# Patient Record
Sex: MALE | Race: BLACK OR AFRICAN AMERICAN | Employment: UNEMPLOYED | URBAN - METROPOLITAN AREA
[De-identification: names, ages, dates, MRNs, and addresses within clinical notes are randomized per-mention and may not be internally consistent; named-entity substitution may affect disease eponyms.]

---

## 2020-01-31 ENCOUNTER — TRANSFERRED RECORDS (OUTPATIENT)
Dept: HEALTH INFORMATION MANAGEMENT | Facility: CLINIC | Age: 50
End: 2020-01-31

## 2020-02-03 ENCOUNTER — MEDICAL CORRESPONDENCE (OUTPATIENT)
Dept: HEALTH INFORMATION MANAGEMENT | Facility: CLINIC | Age: 50
End: 2020-02-03

## 2020-02-14 DIAGNOSIS — H35.52 RP (RETINITIS PIGMENTOSA): Primary | ICD-10-CM

## 2020-03-21 ENCOUNTER — HOSPITAL (OUTPATIENT)
Dept: OTHER | Age: 50
End: 2020-03-21

## 2020-03-21 PROCEDURE — 99283 EMERGENCY DEPT VISIT LOW MDM: CPT | Performed by: EMERGENCY MEDICINE

## 2020-03-26 ENCOUNTER — TELEPHONE (OUTPATIENT)
Dept: OPHTHALMOLOGY | Facility: CLINIC | Age: 50
End: 2020-03-26

## 2020-03-26 NOTE — TELEPHONE ENCOUNTER
03-26:  03-26:  Phoned [facility] and spoke w/Walter ([phone]) to inform Dr. Nakul Sow that the [facility] has made the decision to reschedule the GVF+ffERG appt (prev sched for 04-20) at this time in attempts to control COVID-19.  We will be contacting patients to reschedule.     Phoned and spoke w/pt and informed of the above.  Understands to contact his eye care provider if any vision concerns in the meantime.     Sent IB to  to cancel 2 appts for 04-20.    03-27:  To schedule June-July as COVID-19 restrictions allow.

## 2020-08-04 ENCOUNTER — APPOINTMENT (OUTPATIENT)
Dept: OPHTHALMOLOGY | Facility: CLINIC | Age: 50
End: 2020-08-04
Attending: OPHTHALMOLOGY
Payer: COMMERCIAL

## 2020-08-04 DIAGNOSIS — H35.52 RP (RETINITIS PIGMENTOSA): ICD-10-CM

## 2020-08-04 PROCEDURE — 92273 FULL FIELD ERG W/I&R: CPT | Mod: ZF

## 2020-08-04 PROCEDURE — 40000269 ZZH STATISTIC NO CHARGE FACILITY FEE: Mod: ZF

## 2020-08-04 PROCEDURE — 92083 EXTENDED VISUAL FIELD XM: CPT | Mod: ZF

## 2020-08-04 ASSESSMENT — VISUAL ACUITY
OD_CC: 20/150
CORRECTION_TYPE: GLASSES
OS_CC: 20/400
METHOD: SNELLEN - LINEAR

## 2020-08-04 ASSESSMENT — REFRACTION_WEARINGRX
OD_SPHERE: +3.00
OD_ADD: +3.00
SPECS_TYPE: BIFOCAL
OD_AXIS: 160
OD_CYLINDER: +1.00
OS_ADD: +3.00
OS_AXIS: 104
OS_SPHERE: +2.00
OS_CYLINDER: +0.75

## 2020-08-04 NOTE — PROGRESS NOTES
"2020    STANDARD ERG REPORT    Referring: Dr. Nakul Sow/[facility]    RE:  Fab Boyd  MRN:  [id number]  :  1970    ERG Date:  2020    CLINICAL HISTORY: patient noted tunnel vision and foggy vision associated with floaters and flashes.  Flashes are constant with eyes open or shut-- white flashes going in circles at times  and brighter flashes in periphery.  Believes vision worse at night/dim light and noted for a while (~10 yrs) but worsening for the past 1-2 yrs.  Takes a long time for eyes to adjust from bright to dark and dark to light.  Increased light sensitivity where he needs a visor/cap and sungls if in very bright light situations.  Moved to [place] in  from [place] and saw eye doctors in [place] with a lot of testing.  From at least ~3001-9141, he still had good enough vision and depth perception and coordination to read, ride motorcycle, play sports, do target shooting. He always had difficulty with dark blue vs black and felt he was clumsy (tripping over things) but did not feel vision any different than others around him.  H/O older sister with vision loss but reason undetermined.  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    Dr. Sow advised Areds supplements twice daily (pt using once daily).  Under care of Dr. Abundio Kyle for glaucoma ~6yrs, using Latanoprost both eyes at bedtime (last dose 2 days ago).  S/P peripheral iridotomy with Dr. Kyle 2016 both eyes and again 2018 left eye (per Chart Review).     IMPRESSION: 1. Advanced diana-cone dystrophy                  Visual Acuity Right Eye : 20/150, PHNI      w/gls, +3.00 + 1.00x160    Visual Acuity Left Eye : 20/400, PHNI      w/gls, +2.50 + 0.75x104                          ALL AVERAGED  Data for Full-Field ERG Right Eye   Left Eye    Dark-Adapted Patient Normal Patient   0.01 ERG (diana) amplitude( v) ---- 61.3-334.7 ----   0.01 ERG (diana) implicit time(ms) ---- " 75.1-108.0 ----   MMMMM      3.0 ERG (combined) a-wave amplitude( v) ---- -195.0 to -96.5 ----   3.0 ERG (combined) a-wave implicit time(ms) ---- 15.2-18.2 ----   3.0 ERG (combined) b-wave amplitude( v) ---- 115.0-446.3 ----   3.0 ERG (combined) b-wave implicit time(ms) ---- 30.0-50.3 ----   MMMMM      3.0 Oscillatory Potentials   Present     Light-Adapted      3.0 Flicker (30-Hz) amplitude( v) ---- 68.5-147.5 ----   3.0 Flicker (30-Hz) implicit time(ms) ---- 21.5-26.8 ----         3.0 ERG (cone) a-wave amplitude( v) ---- -59.8 to -24.4 ----   3.0 ERG (cone) a-wave implicit time(ms) ---- 14.9-18.0 ----   3.0 ERG (cone) b-wave amplitude( v) ---- 86.1-205.2 ----   3.0 ERG (cone) b-wave implicit time(ms) ---- 25.8-29.6 ----         * = manipulated cursors        parentheses = cursors at selected peaks        ---- = residual to non-measurable        xxxx = not tested      INTERPRETATION:        This full field electroretinogram was performed according to ISCEV standards using [employer] E3 system and DTL fiber recording electrodes. The patient tolerated the testing well.  The waveforms were residual to non-measurable.  The normative values provided above represent the 95% confidence limits for a normal individual the age of the patient.   In dark adapted conditions, the diana-specific responses and combined diana-done responses were residual to non-measurable  The oscillatory potentials were were residual to non-measurable.  In light adapted conditions, the 30-Hz flicker responses and single photopic responses were residual to non-measurable.    Conclusion: This represents an abnormal electroretinogram consistent with the diagnosis of advanced diana-cone dystrophy.  The scotopic and photopic responses were residual to non-measurable. The differential diagnosis includes: post-inflammatory retinopathy, toxic retinopathy and Autoimmune Retinopathy    Clinical correlation is recommended.     Dear Eber, thank you for the opportunity  to provide electrophysiologic services for this patient.  Please do not hesitate to call if there should be any questions regarding these results.    Muriel Zapata MD     [facility]   Department of Ophthalmology & Visual Neurosciences   [facility]  Phone: [phone]   Fax: [phone]

## 2020-08-04 NOTE — NURSING NOTE
"Referred by Dr. Nakul Sow/[facility] for GVF+ffERG for RP.  HEIDI w/Dr. Sow 01-31-20.  Pt noted tunnel vision and foggy vision w/a lot of floaters and flashes.  Flashes are constant with eyes open or shut-- white flashes going in circles at times  and brighter flashes in periphery.  Believes vision worse at night/dim light and noted for a while (~10 yrs) but worsening x1-2 yrs.  Takes a long time for eyes to adjust from bright to dark and dark to light.  Increased light sensitivity where he needs a visor/cap and sungls if in very bright light situations.  Moved to [place] in 2002 from [place] and saw eye doctors in [place] w/a lot of testing.  From at least ~8773-8756, he still had good enough vision and depth perception and coordination to read, ride motorcycle, play sports, do target shooting. He always had difficulty w/dark blue vs black and felt he was clumsy (tripping over things) but did not feel vision any different than others around him.  H/O older sister w/vision loss but reason undetermined.  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    Dr. Sow advised Areds supplements twice daily (pt using once daily).  Under care of Dr. Abundio Kyle for glaucoma ~6yrs, using Latanoprost both eyes at bedtime (last dose 2 days ago).  S/P peripheral iridotomy w/Dr. Kyle 2016 both eyes and again 2018 left eye (per Chart Review).  Arrives w/white cane.  Scheduled for f/u w/Dr. Sow end of Oct 2020; will await results.    "

## 2020-08-04 NOTE — PROGRESS NOTES
"2020    STANDARD GVF REPORT    Referring: Dr. Nakul Sow/[facility]    RE: Fab Boyd  MRN: [id number]  : 1970                 GVF Date:  2020    CLINICAL HISTORY: patient noted tunnel vision and foggy vision associated with floaters and flashes.  Flashes are constant with eyes open or shut-- white flashes going in circles at times  and brighter flashes in periphery.  Believes vision worse at night/dim light and noted for a while (~10 yrs) but worsening for the past 1-2 yrs.  Takes a long time for eyes to adjust from bright to dark and dark to light.  Increased light sensitivity where he needs a visor/cap and sungls if in very bright light situations.  Moved to [place] in  from [place] and saw eye doctors in [place] with a lot of testing.  From at least ~6414-8232, he still had good enough vision and depth perception and coordination to read, ride motorcycle, play sports, do target shooting. He always had difficulty with dark blue vs black and felt he was clumsy (tripping over things) but did not feel vision any different than others around him.  H/O older sister with vision loss but reason undetermined.  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    Dr. Sow advised Areds supplements twice daily (pt using once daily).  Under care of Dr. Abundio Kyle for glaucoma ~6yrs, using Latanoprost both eyes at bedtime (last dose 2 days ago).  S/P peripheral iridotomy with Dr. Kyle 2016 both eyes and again 2018 left eye (per Chart Review).     IMPRESSION: 1. 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 Slow to static check with V4e and inconsistent to III4e.   There is " severe constriction to less than 20 central degrees  V4e    LE: Did not visualize I2e through III4e in central 30 degrees and periphery. Did visualize IV4e and V4e with good static checks.   There is severe constriction to less than 20 central degrees  V4e    Muriel Zapata MD  .  [facility]   Department of Ophthalmology and Visual Neurosciences   [facility]  Phone: [phone]   Fax: [phone]